# Patient Record
Sex: MALE | URBAN - METROPOLITAN AREA
[De-identification: names, ages, dates, MRNs, and addresses within clinical notes are randomized per-mention and may not be internally consistent; named-entity substitution may affect disease eponyms.]

---

## 2020-09-11 ENCOUNTER — WEB ENCOUNTER (OUTPATIENT)
Dept: URBAN - METROPOLITAN AREA CLINIC 80 | Facility: CLINIC | Age: 70
End: 2020-09-11

## 2020-09-11 ENCOUNTER — OFFICE VISIT (OUTPATIENT)
Dept: URBAN - METROPOLITAN AREA CLINIC 80 | Facility: CLINIC | Age: 70
End: 2020-09-11
Payer: COMMERCIAL

## 2020-09-11 DIAGNOSIS — Z12.11 COLON CANCER SCREENING: ICD-10-CM

## 2020-09-11 DIAGNOSIS — R11.2 NAUSEA & VOMITING: ICD-10-CM

## 2020-09-11 DIAGNOSIS — R63.4 ABNORMAL WEIGHT LOSS: ICD-10-CM

## 2020-09-11 DIAGNOSIS — R10.84 ABDOMINAL CRAMPING, GENERALIZED: ICD-10-CM

## 2020-09-11 PROCEDURE — 99244 OFF/OP CNSLTJ NEW/EST MOD 40: CPT | Performed by: INTERNAL MEDICINE

## 2020-09-11 PROCEDURE — 99204 OFFICE O/P NEW MOD 45 MIN: CPT | Performed by: INTERNAL MEDICINE

## 2020-09-11 RX ORDER — PANTOPRAZOLE SODIUM 40 MG/1
1 TABLET TABLET, DELAYED RELEASE ORAL ONCE A DAY
Qty: 90 TABLET | Refills: 3 | OUTPATIENT

## 2020-09-11 RX ORDER — SUCRALFATE 1 G/1
1 TABLET ON AN EMPTY STOMACH TABLET ORAL
Qty: 180 | Refills: 2 | OUTPATIENT

## 2020-09-11 NOTE — HPI-TODAY'S VISIT:
abd pain that started 1 week ago pain is occasional and random  it is random assoc bloating  pain is suprapubic some nausea and vomiting no fever or chills no prior trip to restaurant or new meds  he went to the ER on 9/3/2020 with normal cbc, cmp, lipase mildly high at 68.   ct with esophagitis and distal gastritis  stools are norjmal with bristol scale 4 daily bm improved pain with ongoing bloating/gas poor energy appetite is low he has lost 15  lb  in the past week not exacerbated by eating or bm  brother with colon cancer.  no fam hx of gastric cancer last negative cologuard last year.

## 2020-09-14 ENCOUNTER — TELEPHONE ENCOUNTER (OUTPATIENT)
Dept: URBAN - METROPOLITAN AREA CLINIC 92 | Facility: CLINIC | Age: 70
End: 2020-09-14

## 2020-09-22 ENCOUNTER — OFFICE VISIT (OUTPATIENT)
Dept: URBAN - METROPOLITAN AREA SURGERY CENTER 19 | Facility: SURGERY CENTER | Age: 70
End: 2020-09-22
Payer: COMMERCIAL

## 2020-09-22 ENCOUNTER — CLAIMS CREATED FROM THE CLAIM WINDOW (OUTPATIENT)
Dept: URBAN - METROPOLITAN AREA CLINIC 4 | Facility: CLINIC | Age: 70
End: 2020-09-22
Payer: COMMERCIAL

## 2020-09-22 DIAGNOSIS — K31.89 ACQUIRED DEFORMITY OF DUODENUM: ICD-10-CM

## 2020-09-22 DIAGNOSIS — R10.84 ABDOMINAL CRAMPING, GENERALIZED: ICD-10-CM

## 2020-09-22 DIAGNOSIS — K31.89 OTHER DISEASES OF STOMACH AND DUODENUM: ICD-10-CM

## 2020-09-22 DIAGNOSIS — K29.60 OTHER GASTRITIS WITHOUT BLEEDING: ICD-10-CM

## 2020-09-22 PROCEDURE — 88305 TISSUE EXAM BY PATHOLOGIST: CPT | Performed by: PATHOLOGY

## 2020-09-22 PROCEDURE — 88342 IMHCHEM/IMCYTCHM 1ST ANTB: CPT | Performed by: PATHOLOGY

## 2020-09-22 PROCEDURE — 88312 SPECIAL STAINS GROUP 1: CPT | Performed by: PATHOLOGY

## 2020-09-22 PROCEDURE — 43239 EGD BIOPSY SINGLE/MULTIPLE: CPT | Performed by: INTERNAL MEDICINE

## 2020-09-22 PROCEDURE — G8907 PT DOC NO EVENTS ON DISCHARG: HCPCS | Performed by: INTERNAL MEDICINE

## 2020-09-22 RX ORDER — PANTOPRAZOLE SODIUM 40 MG/1
1 TABLET TABLET, DELAYED RELEASE ORAL ONCE A DAY
Qty: 90 TABLET | Refills: 3 | Status: ACTIVE | COMMUNITY

## 2020-09-22 RX ORDER — SUCRALFATE 1 G/1
1 TABLET ON AN EMPTY STOMACH TABLET ORAL
Qty: 180 | Refills: 2 | Status: ACTIVE | COMMUNITY

## 2020-10-14 ENCOUNTER — TELEPHONE ENCOUNTER (OUTPATIENT)
Dept: URBAN - METROPOLITAN AREA CLINIC 92 | Facility: CLINIC | Age: 70
End: 2020-10-14

## 2020-10-20 ENCOUNTER — OFFICE VISIT (OUTPATIENT)
Dept: URBAN - METROPOLITAN AREA SURGERY CENTER 19 | Facility: SURGERY CENTER | Age: 70
End: 2020-10-20

## 2020-10-20 RX ORDER — SUCRALFATE 1 G/1
1 TABLET ON AN EMPTY STOMACH TABLET ORAL
Qty: 180 | Refills: 2 | Status: ACTIVE | COMMUNITY

## 2020-10-20 RX ORDER — PANTOPRAZOLE SODIUM 40 MG/1
1 TABLET TABLET, DELAYED RELEASE ORAL ONCE A DAY
Qty: 90 TABLET | Refills: 3 | Status: ACTIVE | COMMUNITY

## 2020-10-26 ENCOUNTER — OFFICE VISIT (OUTPATIENT)
Dept: URBAN - METROPOLITAN AREA CLINIC 80 | Facility: CLINIC | Age: 70
End: 2020-10-26

## 2020-11-16 ENCOUNTER — WEB ENCOUNTER (OUTPATIENT)
Dept: URBAN - METROPOLITAN AREA CLINIC 80 | Facility: CLINIC | Age: 70
End: 2020-11-16

## 2020-11-16 ENCOUNTER — OFFICE VISIT (OUTPATIENT)
Dept: URBAN - METROPOLITAN AREA CLINIC 80 | Facility: CLINIC | Age: 70
End: 2020-11-16
Payer: COMMERCIAL

## 2020-11-16 ENCOUNTER — DASHBOARD ENCOUNTERS (OUTPATIENT)
Age: 70
End: 2020-11-16

## 2020-11-16 VITALS
WEIGHT: 160.8 LBS | HEART RATE: 106 BPM | TEMPERATURE: 97 F | BODY MASS INDEX: 23.74 KG/M2 | SYSTOLIC BLOOD PRESSURE: 110 MMHG | DIASTOLIC BLOOD PRESSURE: 70 MMHG

## 2020-11-16 DIAGNOSIS — R10.2 SUPRAPUBIC ABDOMINAL PAIN: ICD-10-CM

## 2020-11-16 DIAGNOSIS — R63.4 ABNORMAL WEIGHT LOSS: ICD-10-CM

## 2020-11-16 DIAGNOSIS — R10.9 ABDOMINAL PAIN: ICD-10-CM

## 2020-11-16 DIAGNOSIS — Z12.11 COLON CANCER SCREENING: ICD-10-CM

## 2020-11-16 DIAGNOSIS — R63.0 ANOREXIA: ICD-10-CM

## 2020-11-16 DIAGNOSIS — R11.2 NAUSEA & VOMITING: ICD-10-CM

## 2020-11-16 DIAGNOSIS — Z80.0 FAMILY HISTORY OF COLON CANCER: ICD-10-CM

## 2020-11-16 PROBLEM — 79890006 ANOREXIA: Status: ACTIVE | Noted: 2020-09-11

## 2020-11-16 PROCEDURE — 3017F COLORECTAL CA SCREEN DOC REV: CPT | Performed by: INTERNAL MEDICINE

## 2020-11-16 PROCEDURE — G8420 CALC BMI NORM PARAMETERS: HCPCS | Performed by: INTERNAL MEDICINE

## 2020-11-16 PROCEDURE — G8484 FLU IMMUNIZE NO ADMIN: HCPCS | Performed by: INTERNAL MEDICINE

## 2020-11-16 PROCEDURE — G9903 PT SCRN TBCO ID AS NON USER: HCPCS | Performed by: INTERNAL MEDICINE

## 2020-11-16 PROCEDURE — G8427 DOCREV CUR MEDS BY ELIG CLIN: HCPCS | Performed by: INTERNAL MEDICINE

## 2020-11-16 PROCEDURE — 99214 OFFICE O/P EST MOD 30 MIN: CPT | Performed by: INTERNAL MEDICINE

## 2020-11-16 RX ORDER — LOSARTAN POTASSIUM AND HYDROCHLOROTHIAZIDE 100; 12.5 MG/1; MG/1
1 TABLET TABLET, FILM COATED ORAL ONCE A DAY
Status: ACTIVE | COMMUNITY

## 2020-11-16 RX ORDER — ONDANSETRON 4 MG/1
1 TABLET ON THE TONGUE AND ALLOW TO DISSOLVE TABLET, ORALLY DISINTEGRATING ORAL ONCE A DAY
Status: ACTIVE | COMMUNITY

## 2020-11-16 RX ORDER — ETODOLAC 400 MG/1
1 TABLET WITH FOOD TABLET, FILM COATED ORAL TWICE A DAY
Status: ACTIVE | COMMUNITY

## 2020-11-16 RX ORDER — PIOGLITAZONE HYDROCHLORIDE 15 MG/1
1 TABLET TABLET ORAL ONCE A DAY
Status: ACTIVE | COMMUNITY

## 2020-11-16 RX ORDER — PANTOPRAZOLE SODIUM 40 MG/1
1 TABLET TABLET, DELAYED RELEASE ORAL ONCE A DAY
Qty: 90 TABLET | Refills: 3 | Status: ACTIVE | COMMUNITY

## 2020-11-16 RX ORDER — GLIMEPIRIDE 4 MG/1
1 TABLET WITH BREAKFAST OR THE FIRST MAIN MEAL OF THE DAY TABLET ORAL ONCE A DAY
Status: ACTIVE | COMMUNITY

## 2020-11-16 RX ORDER — METFORMIN HYDROCHLORIDE 1000 MG/1
1 TABLET WITH A MEAL TABLET, FILM COATED ORAL ONCE A DAY
Status: ACTIVE | COMMUNITY

## 2020-11-16 RX ORDER — HYOSCYAMINE SULFATE 0.12 MG/1
1 TABLET UNDER THE TONGUE AND ALLOW TO DISSOLVE  AS NEEDED TABLET, ORALLY DISINTEGRATING ORAL
Qty: 60 | Refills: 1 | OUTPATIENT

## 2020-11-16 RX ORDER — SUCRALFATE 1 G/1
1 TABLET ON AN EMPTY STOMACH TABLET ORAL
Qty: 180 | Refills: 2 | Status: ACTIVE | COMMUNITY

## 2020-11-16 RX ORDER — ROSUVASTATIN CALCIUM 20 MG/1
1 TABLET TABLET, FILM COATED ORAL ONCE A DAY
Status: ACTIVE | COMMUNITY

## 2020-11-16 RX ORDER — SODIUM, POTASSIUM,MAG SULFATES 17.5-3.13G
354 ML SOLUTION, RECONSTITUTED, ORAL ORAL
Qty: 354 ML | Refills: 0 | OUTPATIENT
Start: 2020-11-16

## 2020-11-16 RX ORDER — AMLODIPINE BESYLATE 5 MG/1
1 TABLET TABLET ORAL ONCE A DAY
Status: ACTIVE | COMMUNITY

## 2020-11-16 NOTE — HPI-TODAY'S VISIT:
he comes int [facility] feeling much better on protonix had 1 episode of pain last week that resolved along with some diarrhea eating better still not gaining weight had egd in 9/22/2020 with unremarkble biopsies no overt gi bleed no other complaints  9/2020 abd pain that started 1 week ago pain is occasional and random  it is random assoc bloating  pain is suprapubic some nausea and vomiting no fever or chills no prior trip to restaurant or new meds  he went to the ER on 9/3/2020 with normal cbc, cmp, lipase mildly high at 68.   ct with esophagitis and distal gastritis  stools are norjmal with bristol scale 4 daily bm improved pain with ongoing bloating/gas poor energy appetite is low he has lost 15  lb  in the past week not exacerbated by eating or bm  brother with colon cancer.  no fam hx of gastric cancer last negative cologuard last year.

## 2020-11-16 NOTE — PHYSICAL EXAM NEUROLOGIC:
oriented to person, place and time , normal sensation , short and long term memory intact Detail Level: Generalized Patient Specific Counseling (Will Not Stick From Patient To Patient): Discussed treating with PC Antifungal nail solution QD for 1 year.  Patient declined today. Detail Level: Detailed

## 2020-11-16 NOTE — PHYSICAL EXAM SKIN:
no rashes , no suspicious lesions , no areas of discoloration , no jaundice present , good turgor , no masses , no tenderness on palpation negative...

## 2020-12-21 ENCOUNTER — ERX REFILL RESPONSE (OUTPATIENT)
Dept: URBAN - METROPOLITAN AREA CLINIC 13 | Facility: CLINIC | Age: 70
End: 2020-12-21

## 2020-12-21 RX ORDER — HYOSCYAMINE SULFATE 0.12 MG/1
TAKE 1 TAB UNDER THE TONGUE AND ALLOW TO DISSOLVE AS NEEDED THREE TIMES A DAY TABLET ORAL; SUBLINGUAL
Qty: 60 | Refills: 1

## 2020-12-29 ENCOUNTER — CLAIMS CREATED FROM THE CLAIM WINDOW (OUTPATIENT)
Dept: URBAN - METROPOLITAN AREA CLINIC 4 | Facility: CLINIC | Age: 70
End: 2020-12-29
Payer: COMMERCIAL

## 2020-12-29 ENCOUNTER — OFFICE VISIT (OUTPATIENT)
Dept: URBAN - METROPOLITAN AREA SURGERY CENTER 19 | Facility: SURGERY CENTER | Age: 70
End: 2020-12-29
Payer: COMMERCIAL

## 2020-12-29 DIAGNOSIS — R63.4 ABNORMAL WEIGHT LOSS: ICD-10-CM

## 2020-12-29 DIAGNOSIS — K63.89 OTHER SPECIFIED DISEASES OF INTESTINE: ICD-10-CM

## 2020-12-29 DIAGNOSIS — R10.9 ABDOMINAL PAIN: ICD-10-CM

## 2020-12-29 DIAGNOSIS — R10.2 ABDOMINAL PAIN, SUPRAPUBIC: ICD-10-CM

## 2020-12-29 PROCEDURE — 45380 COLONOSCOPY AND BIOPSY: CPT | Performed by: INTERNAL MEDICINE

## 2020-12-29 PROCEDURE — G8907 PT DOC NO EVENTS ON DISCHARG: HCPCS | Performed by: INTERNAL MEDICINE

## 2020-12-29 PROCEDURE — 88313 SPECIAL STAINS GROUP 2: CPT | Performed by: PATHOLOGY

## 2020-12-29 PROCEDURE — G9937 DIG OR SURV COLSCO: HCPCS | Performed by: INTERNAL MEDICINE

## 2020-12-29 PROCEDURE — 88305 TISSUE EXAM BY PATHOLOGIST: CPT | Performed by: PATHOLOGY

## 2020-12-29 PROCEDURE — 88342 IMHCHEM/IMCYTCHM 1ST ANTB: CPT | Performed by: PATHOLOGY

## 2021-01-04 ENCOUNTER — ERX REFILL RESPONSE (OUTPATIENT)
Dept: URBAN - METROPOLITAN AREA CLINIC 13 | Facility: CLINIC | Age: 71
End: 2021-01-04

## 2021-01-04 RX ORDER — HYOSCYAMINE SULFATE 0.12 MG/1
TAKE 1 TAB UNDER THE TONGUE AND ALLOW TO DISSOLVE AS NEEDED THREE TIMES A DAY TABLET ORAL; SUBLINGUAL
Qty: 60 | Refills: 1 | OUTPATIENT

## 2021-01-04 RX ORDER — HYOSCYAMINE SULFATE 0.12 MG/1
TAKE 1 TAB UNDER THE TONGUE AND ALLOW TO DISSOLVE AS NEEDED THREE TIMES A DAY TABLET ORAL; SUBLINGUAL
Qty: 180 TABLET | Refills: 1 | OUTPATIENT

## 2021-01-06 ENCOUNTER — ERX REFILL RESPONSE (OUTPATIENT)
Dept: URBAN - METROPOLITAN AREA CLINIC 13 | Facility: CLINIC | Age: 71
End: 2021-01-06

## 2021-01-06 RX ORDER — DICYCLOMINE HYDROCHLORIDE 20 MG/1
1 TABLET TABLET ORAL
Qty: 180 | Refills: 1 | OUTPATIENT

## 2021-01-06 RX ORDER — DICYCLOMINE HYDROCHLORIDE 20 MG/1
TAKE 1 TAB UNDER THE TONGUE AND ALLOW TO DISSOLVE AS NEEDED THREE TIMES A DAY TABLET ORAL
Qty: 180 | Refills: 1 | OUTPATIENT